# Patient Record
Sex: MALE | Race: BLACK OR AFRICAN AMERICAN
[De-identification: names, ages, dates, MRNs, and addresses within clinical notes are randomized per-mention and may not be internally consistent; named-entity substitution may affect disease eponyms.]

---

## 2017-04-21 ENCOUNTER — HOSPITAL ENCOUNTER (INPATIENT)
Dept: HOSPITAL 74 - YASAS | Age: 52
LOS: 6 days | Discharge: OTHER INSTITUTION | DRG: 773 | End: 2017-04-27
Attending: INTERNAL MEDICINE | Admitting: INTERNAL MEDICINE
Payer: COMMERCIAL

## 2017-04-21 VITALS — BODY MASS INDEX: 26.4 KG/M2

## 2017-04-21 DIAGNOSIS — F14.20: ICD-10-CM

## 2017-04-21 DIAGNOSIS — F12.20: ICD-10-CM

## 2017-04-21 DIAGNOSIS — F32.9: ICD-10-CM

## 2017-04-21 DIAGNOSIS — F11.23: Primary | ICD-10-CM

## 2017-04-21 DIAGNOSIS — F19.24: ICD-10-CM

## 2017-04-21 LAB
APPEARANCE UR: CLEAR
BILIRUB UR STRIP.AUTO-MCNC: NEGATIVE MG/DL
COLOR UR: YELLOW
HIV 1 & 2 AB: NEGATIVE
HIV 1 AGP24: NEGATIVE
KETONES UR QL STRIP: NEGATIVE
LEUKOCYTE ESTERASE UR QL STRIP.AUTO: NEGATIVE
NITRITE UR QL STRIP: NEGATIVE
PH UR: 5 [PH] (ref 5–8)
PROT UR QL STRIP: NEGATIVE
PROT UR QL STRIP: NEGATIVE
RBC # UR STRIP: NEGATIVE /UL
SP GR UR: 1.02 (ref 1–1.03)
UROBILINOGEN UR STRIP-MCNC: NEGATIVE E.U./DL (ref 0.2–1)

## 2017-04-21 PROCEDURE — HZ2ZZZZ DETOXIFICATION SERVICES FOR SUBSTANCE ABUSE TREATMENT: ICD-10-PCS | Performed by: INTERNAL MEDICINE

## 2017-04-21 RX ADMIN — IBUPROFEN PRN MG: 400 TABLET, FILM COATED ORAL at 17:07

## 2017-04-21 RX ADMIN — NICOTINE SCH MG: 14 PATCH, EXTENDED RELEASE TRANSDERMAL at 15:02

## 2017-04-21 RX ADMIN — Medication SCH MG: at 22:50

## 2017-04-21 RX ADMIN — ACETAMINOPHEN PRN MG: 325 TABLET ORAL at 21:06

## 2017-04-21 NOTE — HP
COWS





- Scale


Resting Pulse: 0= [place] 80 or Below


Sweatin= Chills/Flushing


Restless Observation: 3= Extraneous Movement


Pupil Size: 2= Moderately Dilated


Bone or Joint Aches: 4=Acute Joint/Muscle Pain


Runny Nose/ Eye Tearin= Nasal Congestion


GI Upset > 30mins: 1= Stomach Cramp


Tremor Observation: 2= Slight Tremor Visible


Yawning Observation: 2= >3x During Session


Anxiety or Irritability: 2=Irritable/Anxious


Goose Flesh Skin: 0=Smooth Skin


COWS Score: 18





Admission ROS [facility]





- HPI


Chief Complaint: 


DETOX TX FOR HEROIN DEPENDENCE


Allergies/Adverse Reactions: 


 Allergies











Allergy/AdvReac Type Severity Reaction Status Date / Time


 


PENICILIN Allergy Severe TREMORS Uncoded 17 11:20











History of Present Illness: 


52 Y/O MALE WITH A HX OF HEROIN DEPENDENCE SEEKING DETOX TX


Exam Limitations: No Limitations





- Ebola screening


Have you traveled outside of the country in the last 21 days: No


Have you had contact with anyone from an Ebola affected area: No


Have you been sick,other than usual withdrawal symptoms: No





- Review of Systems


Constitutional: Chills, Loss of Appetite, Night Sweats, Changes in sleep


EENT: reports: Tearing, Nose Congestion, Dental Problems (MISSING TEETH/DECAY)


Respiratory: reports: No Symptoms reported


Cardiac: reports: No Symptoms Reported


GI: reports: Constipated, Diarrhea, Nausea, Vomiting


: reports: No Symptoms Reported


Musculoskeletal: reports: Back Pain, Muscle Pain


Integumentary: reports: No Symptoms Reported


Neuro: reports: No Symptoms reported


Endocrine: reports: No Symptoms Reported


Hematology: reports: No Symptoms Reported


Psychiatric: reports: Orientated x3, Agitated, Anxious, Depressed


Other Systems: Reviewed and Negative





Patient History





- Patient Medical History


Hx Anemia: No


Hx Asthma: No


Hx Chronic Obstructive Pulmonary Disease (COPD): No


Hx Cardiac Disorders: No


Hx Hypertension: No


Hx Hypercholesterolemia: No


HX Cerebrovascular Accident: No


Hx Seizures: No


Hx Diabetes: No


Hx Gastrointestinal Disorders: No


Hx Genitourinary Disorders: No


Hx Sexually Transmitted Disorders: No


Hx Renal Disease (ESRD): No


Hx Thyroid Disease: No


Hx Human Immunodeficiency Virus (HIV): No (NEGATIVE HX)


Hx Hepatitis C: No


Hx Depression: Yes (NO MED)


Hx Suicide Attempt: No


Hx Schizophrenia: No





- Patient Surgical History


Past Surgical History: No


Hx Neurologic Surgery: No


Hx Cataract Extraction: No


Hx Cardiac Surgery: No


Hx Lung Surgery: No


Hx Breast Surgery: No


Hx Breast Biopsy: No


Hx Abdominal Surgery: No


Hx Appendectomy: No


Hx Cholecystectomy: No


Hx Genitourinary Surgery: No


Hx Orthopedic Surgery: No


Anesthesia Reaction: No





- PPD History


Previous Implant?: Yes


Documented Results: Negative w/o proof


PPD to be Administered?: Yes





- Reproductive History


Patient is a Female of Child Bearing Age (11 -55 yrs old): No (MALE)





- Smoking Cessation


Smoking history: Current every day smoker


Have you smoked in the past 12 months: Yes


Aproximately how many cigarettes per day: 10


Hx Chewing Tobacco Use: No


Initiated information on smoking cessation: Yes


'Breaking Loose' booklet given: 17





- Substance & Tx. History


Hx Substance Use: Yes (HEROIN/COCAINE/MARIJUANA)


Substance Use Type: Cocaine, Heroin, Marijuana


Hx Substance Use Treatment: Yes





- Substances Abused


  ** Heroin


Route: Inhalation


Frequency: Daily


Amount used: 8-10 bags


Age of first use: 31


Date of Last Use: 17





  ** Crack


Route: Smoking


Frequency: Daily


Amount used: $100


Age of first use: 18


Date of Last Use: 17





  ** Marijuana


Route: Smoking


Frequency: Daily


Amount used: $25


Age of first use: 18


Date of Last Use: 17





Family Disease History





- Family Disease History


Family History: Denies





Admission Physical Exam [facility]





- Vital Signs


Vital Signs: 


 Vital Signs - 24 hr











  17





  10:32


 


Temperature 96.8 F L


 


Pulse Rate 65


 


Respiratory 18





Rate 


 


Blood Pressure 114/70














- Physical


General Appearance: Yes: Moderate Distress, Irritable, Anxious


HEENTM: Yes: EOMI, Normocephalic, REJI, Pharynx Normal


Respiratory: Yes: Chest Non-Tender, Lungs Clear, Normal Breath Sounds, No 

Respiratory Distress


Neck: Yes: Supple, Trachea in good position


Breast: Yes: Breast Exam Deferred


Cardiology: Yes: Regular Rhythm, Regular Rate, S1, S2


Abdominal: Yes: Normal Bowel Sounds, Non Tender, Soft


Genitourinary: Yes: Other (N/C)


Back: Yes: Within Normal Limits


Musculoskeletal: Yes: full range of Motion, Gait Steady


Extremities: Yes: Normal Range of Motion, Non-Tender


Neurological: Yes: CNs II-XII NML intact, Fully Oriented, Alert, Motor Strength 

5/5


Integumentary: Yes: Dry, Warm


Lymphatic: Yes: Within Normal Limits





- Diagnostic


(1) Opioid dependence with withdrawal


Current Visit: Yes   Status: Acute





(2) Cocaine dependence with withdrawal


Current Visit: Yes   Status: Acute





(3) Cannabis dependence, uncomplicated


Current Visit: Yes   Status: Acute





(4) History of depression


Current Visit: Yes   Status: Chronic








Cleared for Admission [facility]





- Detox or Rehab


[facility] Level of Care: Medically Managed


Detox Regimen/Protocol: Methadone





[facility] Breath Alcohol Content


Breath Alcohol Content: 0





Urine Drug Screen





- Results


Drug Screen Negative: No


Urine Drug Screen Results: THC-Marijuana, JESSICA-Cocaine, OPI-Opiates

## 2017-04-22 LAB
ALBUMIN SERPL-MCNC: 2.9 G/DL (ref 3.4–5)
ALP SERPL-CCNC: 67 U/L (ref 45–117)
ALT SERPL-CCNC: 18 U/L (ref 12–78)
ANION GAP SERPL CALC-SCNC: 8 MMOL/L (ref 8–16)
AST SERPL-CCNC: 17 U/L (ref 15–37)
BILIRUB SERPL-MCNC: 0.1 MG/DL (ref 0.2–1)
CALCIUM SERPL-MCNC: 8.4 MG/DL (ref 8.5–10.1)
CO2 SERPL-SCNC: 25 MMOL/L (ref 21–32)
COCKROFT - GAULT: 121.02
CREAT SERPL-MCNC: 0.9 MG/DL (ref 0.7–1.3)
DEPRECATED RDW RBC AUTO: 15.5 % (ref 11.9–15.9)
GLUCOSE SERPL-MCNC: 74 MG/DL (ref 74–106)
MCH RBC QN AUTO: 31.2 PG (ref 25.7–33.7)
MCHC RBC AUTO-ENTMCNC: 33 G/DL (ref 32–35.9)
MCV RBC: 94.7 FL (ref 80–96)
PLATELET # BLD AUTO: 303 K/MM3 (ref 134–434)
PMV BLD: 8.8 FL (ref 7.5–11.1)
PROT SERPL-MCNC: 6.4 G/DL (ref 6.4–8.2)
SICKLE CELL SCREEN: NEGATIVE
WBC # BLD AUTO: 7 K/MM3 (ref 4–10)

## 2017-04-22 RX ADMIN — IBUPROFEN PRN MG: 400 TABLET, FILM COATED ORAL at 06:04

## 2017-04-22 RX ADMIN — Medication SCH: at 23:15

## 2017-04-22 RX ADMIN — Medication SCH TAB: at 10:31

## 2017-04-22 RX ADMIN — NICOTINE SCH MG: 14 PATCH, EXTENDED RELEASE TRANSDERMAL at 10:32

## 2017-04-22 NOTE — PN
[facility] COWS





- Scale


Resting Pulse: 0= [place] 80 or Below


Sweatin=Flushed/Facial Moisture


Restless Observation: 1= Difficult to Sit Still


Pupil Size: 0= Normal to Room Light


Bone or Joint Aches: 2= Severe Diffuse Aches


Runny Nose/ Eye Tearin= Runny Nose/Eyes


GI Upset > 30mins: 2= Nausea/Diarrhea


Tremor Observation of Outstretched Hands: 2= Slight Tremor Visible


Yawning Observation: 0= None


Anxiety or Irritability: 2=Irritable/Anxious


Goose Flesh Skin: 3=Piloerection


COWS Score: 16





[facility] Progress Note (SOAP)


Subjective: 


sweats, shakes, abdominal cramps 


Objective: 





17 14:08


 Vital Signs - 8 hr











  17





  06:48 09:49


 


Temperature 96.8 F L 97.2 F L


 


Pulse Rate 64 68


 


Respiratory 18 18





Rate  


 


Blood Pressure 135/78 110/76








 Laboratory Last Values











WBC  7.0 K/mm3 (4.0-10.0)   17  06:16    


 


RBC  3.98 M/mm3 (4.00-5.60)  L  17  06:16    


 


Hgb  12.4 GM/dL (11.7-16.9)   17  06:16    


 


Hct  37.7 % (35.4-49)   17  06:16    


 


MCV  94.7 fl (80-96)   17  06:16    


 


MCHC  33.0 g/dl (32.0-35.9)   17  06:16    


 


RDW  15.5 % (11.9-15.9)   17  06:16    


 


Plt Count  303 K/MM3 (134-434)   17  06:16    


 


MPV  8.8 fl (7.5-11.1)   17  06:16    


 


Sickle Cell Screen  Negative  (NEGATIVE)   17  06:16    


 


Sodium  143 mmol/L (136-145)   17  06:16    


 


Potassium  4.4 mmol/L (3.5-5.1)   17  06:16    


 


Chloride  110 mmol/L ()  H  17  06:16    


 


Carbon Dioxide  25 mmol/L (21-32)   17  06:16    


 


Anion Gap  8  (8-16)   17  06:16    


 


BUN  16 mg/dL (7-18)   17  06:16    


 


Creatinine  0.9 mg/dL (0.7-1.3)   17  06:16    


 


Creat Clearance w eGFR  > 60  (>60)   17  06:16    


 


Random Glucose  74 mg/dL ()   17  06:16    


 


Calcium  8.4 mg/dL (8.5-10.1)  L  17  06:16    


 


Total Bilirubin  0.1 mg/dL (0.2-1.0)  L  17  06:16    


 


AST  17 U/L (15-37)   17  06:16    


 


ALT  18 U/L (12-78)   17  06:16    


 


Alkaline Phosphatase  67 U/L ()   17  06:16    


 


Total Protein  6.4 g/dl (6.4-8.2)   17  06:16    


 


Albumin  2.9 g/dl (3.4-5.0)  L  17  06:16    


 


Urine Color  Yellow   17  13:00    


 


Urine Appearance  Clear   17  13:00    


 


Urine pH  5.0  (5.0-8.0)   17  13:00    


 


Ur Specific Gravity  1.023  (1.001-1.035)   17  13:00    


 


Urine Protein  Negative  (NEGATIVE)   17  13:00    


 


Urine Glucose (UA)  Negative  (NEGATIVE)   17  13:00    


 


Urine Ketones  Negative  (NEGATIVE)   17  13:00    


 


Urine Blood  Negative  (NEGATIVE)   17  13:00    


 


Urine Nitrite  Negative  (NEGATIVE)   17  13:00    


 


Urine Bilirubin  Negative  (NEGATIVE)   17  13:00    


 


Urine Urobilinogen  Negative E.U./dl (0.2-1.0)   17  13:00    


 


Ur Leukocyte Esterase  Negative  (NEGATIVE)   17  13:00    


 


RPR Titer  Nonreactive  (NONREACTIVE)   17  06:16    


 


HIV 1&2 Antibody Screen  Negative   17  12:10    


 


HIV P24 Antigen  Negative   17  12:10    








labs noted


Assessment: 





17 14:09


withdrawal sx


Plan: 


continue detox

## 2017-04-22 NOTE — EKG
Test Reason : 

Blood Pressure : ***/*** mmHG

Vent. Rate : 066 BPM     Atrial Rate : 066 BPM

   P-R Int : 148 ms          QRS Dur : 090 ms

    QT Int : 392 ms       P-R-T Axes : 057 060 051 degrees

   QTc Int : 410 ms

 

NORMAL SINUS RHYTHM

NORMAL ECG

NO PREVIOUS ECGS AVAILABLE

Confirmed by MYRNA PISANO MD ([id number]) on 4/22/2017 10:10:14 AM

 

Referred By:             Confirmed By:MYRNA PISANO MD

## 2017-04-22 NOTE — CONSULT
[facility] Psychiatric Consult





- Data


Date of interview: 04/22/17


Admission source: [facility]


Identifying data: This is 51 years old male with no psychiatric hospitalization 

history nintoxicAted with:  Heroin Cocaine and Cannabis


Substance Abuse History: - Substances Abused.  ** Heroin.  Route: Inhalation.  

Frequency: Daily.  Amount used: 8-10 bags.  Age of first use: 31.  Date of Last 

Use: 04/21/17.  ** Crack.  Route: Smoking.  Frequency: Daily.  Amount used: $

100.  Age of first use: 18.  Date of Last Use: 04/20/17.  ** Marijuana.  Route: 

Smoking.  Frequency: Daily.  Amount used: $25.  Age of first use: 18.  Date of 

Last Use: 04/20/17


Medical History: Denies


Psychiatric History: Patient denies past psychiatric history


Physical/Sexual Abuse/Trauma History: Denies


Additional Comment: Observation.  Detox Unit Care Protocol





Mental Status Exam





- Mental Status Exam


Alert and Oriented to: Person


Cognitive Function: Fair


Patient Appearance: Well Groomed


Mood: Apprehensive


Affect: Mood Congruent


Patient Behavior: Cooperative


Speech Pattern: Appropriate


Voice Loudness: Normal


Thought Process: Goal Oriented


Thought Disorder: Being Controlled


Hallucinations: Denies


Suicidal Ideation: Denies


Homicidal Ideation: Denies


Insight/Judgement: Fair


Sleep: Difficulty falling asleep


Appetite: Fair


Muscle strength/Tone: Normal


Gait/Station: Normal


Additional Comments: Observation.  Detox Unit Care Protocol





Psychiatric Findings





- Problem List (Axis 1, 2,3)


(1) Cannabis dependence, uncomplicated


Current Visit: Yes   Status: Acute





(2) Cocaine dependence with withdrawal


Current Visit: Yes   Status: Acute





(3) Opioid dependence with withdrawal


Current Visit: Yes   Status: Acute





(4) Drug-induced mood disorder


Current Visit: Yes   Status: Suspected








- Initial Treatment Plan


Initial Treatment Plan: Observation.  Detox Unit Care Protocol

## 2017-04-22 NOTE — DS
[facility] Detox Discharge Summary


Admission Date: 


04/21/17





Discharge Date: 04/22/17





- History


Present History: Cannabis Dependence, Opioid Dependence





- Physical Exam Results


Vital Signs: 


 Vital Signs











Temperature  98.1 F   04/22/17 14:59


 


Pulse Rate  62   04/22/17 14:59


 


Respiratory Rate  18   04/22/17 14:59


 


Blood Pressure  119/70   04/22/17 14:59


 


O2 Sat by Pulse Oximetry (%)      











Pertinent Admission Physical Exam Findings: 


withdrawal sx





- Medication


Discharge Medications: 


Ambulatory Orders





NK [No Known Home Medication]  04/21/17 











- Diagnosis


(1) Cannabis dependence, uncomplicated


Current Visit: Yes   Status: Acute





(2) Cocaine dependence with withdrawal


Current Visit: Yes   Status: Acute





(3) Opioid dependence with withdrawal


Current Visit: Yes   Status: Acute





(4) History of depression


Current Visit: Yes   Status: Chronic








- AMA


Did Patient Leave Against Medical Advice: No (Administrative d/c, destroyed 

property (bed))

## 2017-04-23 LAB
APPEARANCE UR: CLEAR
BILIRUB UR STRIP.AUTO-MCNC: NEGATIVE MG/DL
COLOR UR: (no result)
KETONES UR QL STRIP: NEGATIVE
LEUKOCYTE ESTERASE UR QL STRIP.AUTO: NEGATIVE
NITRITE UR QL STRIP: NEGATIVE
PH UR: 7 [PH] (ref 5–8)
PROT UR QL STRIP: NEGATIVE
PROT UR QL STRIP: NEGATIVE
RBC # UR STRIP: NEGATIVE /UL
SP GR UR: 1.01 (ref 1–1.03)
UROBILINOGEN UR STRIP-MCNC: NEGATIVE E.U./DL (ref 0.2–1)

## 2017-04-23 RX ADMIN — ACETAMINOPHEN PRN MG: 325 TABLET ORAL at 11:35

## 2017-04-23 RX ADMIN — Medication SCH TAB: at 10:31

## 2017-04-23 RX ADMIN — ACETAMINOPHEN PRN MG: 325 TABLET ORAL at 23:00

## 2017-04-23 RX ADMIN — Medication SCH MG: at 22:47

## 2017-04-23 RX ADMIN — NICOTINE SCH MG: 14 PATCH, EXTENDED RELEASE TRANSDERMAL at 10:32

## 2017-04-23 NOTE — PN
[facility] COWS





- Scale


Resting Pulse: 0= [place] 80 or Below


Sweatin= Chills/Flushing


Restless Observation: 3= Extraneous Movement


Pupil Size: 0= Normal to Room Light


Bone or Joint Aches: 2= Severe Diffuse Aches


Runny Nose/ Eye Tearin= Runny Nose/Eyes


GI Upset > 30mins: 1= Stomach Cramp


Tremor Observation of Outstretched Hands: 0= None


Yawning Observation: 1= 1-2x During Session


Anxiety or Irritability: 2=Irritable/Anxious


Goose Flesh Skin: 0=Smooth Skin


COWS Score: 12





[facility]HS Progress Note (SOAP)


Subjective: 


Anxious, sweating, LBP, interrupted sleep


Objective: 





17 16:21


 Laboratory Tests











  17





  12:10 13:00 06:16


 


WBC    7.0


 


RBC    3.98 L


 


Hgb    12.4


 


Hct    37.7


 


MCV    94.7


 


MCHC    33.0


 


RDW    15.5


 


Plt Count    303


 


MPV    8.8


 


Sickle Cell Screen    Negative


 


Sodium   


 


Potassium   


 


Chloride   


 


Carbon Dioxide   


 


Anion Gap   


 


BUN   


 


Creatinine   


 


Creat Clearance w eGFR   


 


Random Glucose   


 


Calcium   


 


Total Bilirubin   


 


AST   


 


ALT   


 


Alkaline Phosphatase   


 


Total Protein   


 


Albumin   


 


Urine Color   Yellow 


 


Urine Appearance   Clear 


 


Urine pH   5.0 


 


Ur Specific Gravity   1.023 


 


Urine Protein   Negative 


 


Urine Glucose (UA)   Negative 


 


Urine Ketones   Negative 


 


Urine Blood   Negative 


 


Urine Nitrite   Negative 


 


Urine Bilirubin   Negative 


 


Urine Urobilinogen   Negative 


 


Ur Leukocyte Esterase   Negative 


 


RPR Titer   


 


HIV 1&2 Antibody Screen  Negative  


 


HIV P24 Antigen  Negative  














  17





  06:16 06:16 10:00


 


WBC   


 


RBC   


 


Hgb   


 


Hct   


 


MCV   


 


MCHC   


 


RDW   


 


Plt Count   


 


MPV   


 


Sickle Cell Screen   


 


Sodium  143  


 


Potassium  4.4  


 


Chloride  110 H  


 


Carbon Dioxide  25  


 


Anion Gap  8  


 


BUN  16  


 


Creatinine  0.9  


 


Creat Clearance w eGFR  > 60  


 


Random Glucose  74  


 


Calcium  8.4 L  


 


Total Bilirubin  0.1 L  


 


AST  17  


 


ALT  18  


 


Alkaline Phosphatase  67  


 


Total Protein  6.4  


 


Albumin  2.9 L  


 


Urine Color    Straw


 


Urine Appearance    Clear


 


Urine pH    7.0  D


 


Ur Specific [place]    1.011


 


Urine Protein    Negative


 


Urine Glucose (UA)    Negative


 


Urine Ketones    Negative


 


Urine Blood    Negative


 


Urine Nitrite    Negative


 


Urine Bilirubin    Negative


 


Urine Urobilinogen    Negative


 


Ur Leukocyte Esterase    Negative


 


RPR Titer   Nonreactive 


 


HIV 1&2 Antibody Screen   


 


HIV P24 Antigen   








Labs noted


Assessment: 





17 16:21


Withdrawal symptoms


Plan: 


Continue detox

## 2017-04-24 RX ADMIN — Medication SCH TAB: at 10:20

## 2017-04-24 RX ADMIN — ZOLPIDEM TARTRATE PRN MG: 10 TABLET, FILM COATED ORAL at 22:39

## 2017-04-24 RX ADMIN — Medication SCH MG: at 22:37

## 2017-04-24 RX ADMIN — ACETAMINOPHEN PRN MG: 325 TABLET ORAL at 10:22

## 2017-04-24 RX ADMIN — NICOTINE SCH MG: 14 PATCH, EXTENDED RELEASE TRANSDERMAL at 10:21

## 2017-04-24 NOTE — PN
[facility] Progress Note (SOAP)


Subjective: 


Sweating,interrupted sleep,restless,tremors,


Objective: 





04/24/17 14:48


 Vital Signs - 8 hr











  04/24/17 04/24/17





  09:54 14:14


 


Temperature 97.0 F L 


 


Pulse Rate 78 77


 


Respiratory 18 22





Rate  


 


Blood Pressure 99/65 110/68








 Laboratory Last Values











WBC  7.0 K/mm3 (4.0-10.0)   04/22/17  06:16    


 


RBC  3.98 M/mm3 (4.00-5.60)  L  04/22/17  06:16    


 


Hgb  12.4 GM/dL (11.7-16.9)   04/22/17  06:16    


 


Hct  37.7 % (35.4-49)   04/22/17  06:16    


 


MCV  94.7 fl (80-96)   04/22/17  06:16    


 


MCHC  33.0 g/dl (32.0-35.9)   04/22/17  06:16    


 


RDW  15.5 % (11.9-15.9)   04/22/17  06:16    


 


Plt Count  303 K/MM3 (134-434)   04/22/17  06:16    


 


MPV  8.8 fl (7.5-11.1)   04/22/17  06:16    


 


Sickle Cell Screen  Negative  (NEGATIVE)   04/22/17  06:16    


 


Sodium  143 mmol/L (136-145)   04/22/17  06:16    


 


Potassium  4.4 mmol/L (3.5-5.1)   04/22/17  06:16    


 


Chloride  110 mmol/L ()  H  04/22/17  06:16    


 


Carbon Dioxide  25 mmol/L (21-32)   04/22/17  06:16    


 


Anion Gap  8  (8-16)   04/22/17  06:16    


 


BUN  16 mg/dL (7-18)   04/22/17  06:16    


 


Creatinine  0.9 mg/dL (0.7-1.3)   04/22/17  06:16    


 


Creat Clearance w eGFR  > 60  (>60)   04/22/17  06:16    


 


Random Glucose  74 mg/dL ()   04/22/17  06:16    


 


Calcium  8.4 mg/dL (8.5-10.1)  L  04/22/17  06:16    


 


Total Bilirubin  0.1 mg/dL (0.2-1.0)  L  04/22/17  06:16    


 


AST  17 U/L (15-37)   04/22/17  06:16    


 


ALT  18 U/L (12-78)   04/22/17  06:16    


 


Alkaline Phosphatase  67 U/L ()   04/22/17  06:16    


 


Total Protein  6.4 g/dl (6.4-8.2)   04/22/17  06:16    


 


Albumin  2.9 g/dl (3.4-5.0)  L  04/22/17  06:16    


 


Urine Color  Straw   04/23/17  10:00    


 


Urine Appearance  Clear   04/23/17  10:00    


 


Urine pH  7.0  (5.0-8.0)  D 04/23/17  10:00    


 


Ur Specific Gravity  1.011  (1.001-1.035)   04/23/17  10:00    


 


Urine Protein  Negative  (NEGATIVE)   04/23/17  10:00    


 


Urine Glucose (UA)  Negative  (NEGATIVE)   04/23/17  10:00    


 


Urine Ketones  Negative  (NEGATIVE)   04/23/17  10:00    


 


Urine Blood  Negative  (NEGATIVE)   04/23/17  10:00    


 


Urine Nitrite  Negative  (NEGATIVE)   04/23/17  10:00    


 


Urine Bilirubin  Negative  (NEGATIVE)   04/23/17  10:00    


 


Urine Urobilinogen  Negative E.U./dl (0.2-1.0)   04/23/17  10:00    


 


Ur Leukocyte Esterase  Negative  (NEGATIVE)   04/23/17  10:00    


 


RPR Titer  Nonreactive  (NONREACTIVE)   04/22/17  06:16    


 


HIV 1&2 Antibody Screen  Negative   04/21/17  12:10    


 


HIV P24 Antigen  Negative   04/21/17  12:10    








labs noted


Assessment: 





04/24/17 14:48


Withdrawal sx.


Plan: 


Continue detox

## 2017-04-24 NOTE — PN
[facility] Progress Note


Note: 


Psychiatry


Attending's note : 


Approached by patient.


Complaint : insomnia.


Chart reviewed.Dr Cuevas's note is appreciated.


Intervention :


Education about virtues of sleep hygiene.


Zolpidem 10 mg po hs.Side effects/benefits discussed.


Mr Taylor is made aware of risk of parasomnias.


He agrees with this careplan.

## 2017-04-25 RX ADMIN — ZOLPIDEM TARTRATE PRN MG: 10 TABLET, FILM COATED ORAL at 22:24

## 2017-04-25 RX ADMIN — BACITRACIN ZINC SCH GM: 500 OINTMENT TOPICAL at 14:38

## 2017-04-25 RX ADMIN — BACITRACIN ZINC SCH GM: 500 OINTMENT TOPICAL at 22:23

## 2017-04-25 RX ADMIN — Medication SCH MG: at 22:22

## 2017-04-25 RX ADMIN — Medication SCH TAB: at 10:20

## 2017-04-25 RX ADMIN — HYDROCORTISONE SCH APPLIC: 1 CREAM TOPICAL at 22:23

## 2017-04-25 RX ADMIN — NICOTINE SCH MG: 14 PATCH, EXTENDED RELEASE TRANSDERMAL at 10:20

## 2017-04-25 RX ADMIN — HYDROCORTISONE SCH APPLIC: 1 CREAM TOPICAL at 14:38

## 2017-04-25 NOTE — PN
[facility] Progress Note (SOAP)


Subjective: 


DECREASED ANXIETY,SWEATS,IRRITABILITY.


Objective: 





04/25/17 10:46


 Vital Signs











Temperature  96.4 F L  04/25/17 09:34


 


Pulse Rate  79   04/25/17 09:34


 


Respiratory Rate  18   04/25/17 09:34


 


Blood Pressure  110/68   04/25/17 09:34


 


O2 Sat by Pulse Oximetry (%)      











Assessment: 





04/25/17 10:46


WITHDRAWAL SX


Plan: 


CONTINUE DETOX

## 2017-04-25 NOTE — DS
[facility] Detox Discharge Summary


Admission Date: 


04/21/17








- History


Present History: Cannabis Dependence, Cocaine Dependence, Opioid Dependence


Additional Comments: 


DECREASED ANXIETY,SWEATS,IRRITABILITY.





- Physical Exam Results


Vital Signs: 


 Vital Signs











Temperature  96.4 F L  04/25/17 09:34


 


Pulse Rate  79   04/25/17 09:34


 


Respiratory Rate  18   04/25/17 09:34


 


Blood Pressure  110/68   04/25/17 09:34


 


O2 Sat by Pulse Oximetry (%)      














- Medication


Discharge Medications: 


Ambulatory Orders





NK [No Known Home Medication]  04/21/17 











- Diagnosis


(1) Opioid dependence with withdrawal


Current Visit: Yes   Status: Acute





(2) Cocaine dependence with withdrawal


Current Visit: Yes   Status: Acute





(3) Cannabis dependence, uncomplicated


Current Visit: Yes   Status: Acute





(4) History of depression


Current Visit: Yes   Status: Chronic

## 2017-04-26 RX ADMIN — BACITRACIN ZINC SCH GM: 500 OINTMENT TOPICAL at 22:50

## 2017-04-26 RX ADMIN — HYDROCORTISONE SCH: 1 CREAM TOPICAL at 10:27

## 2017-04-26 RX ADMIN — HYDROCORTISONE SCH APPLIC: 1 CREAM TOPICAL at 22:53

## 2017-04-26 RX ADMIN — NICOTINE SCH: 14 PATCH, EXTENDED RELEASE TRANSDERMAL at 10:27

## 2017-04-26 RX ADMIN — Medication SCH MG: at 22:50

## 2017-04-26 RX ADMIN — Medication SCH TAB: at 10:27

## 2017-04-26 RX ADMIN — BACITRACIN ZINC SCH: 500 OINTMENT TOPICAL at 10:28

## 2017-04-26 RX ADMIN — ZOLPIDEM TARTRATE PRN MG: 10 TABLET, FILM COATED ORAL at 22:51

## 2017-04-27 ENCOUNTER — HOSPITAL ENCOUNTER (INPATIENT)
Dept: HOSPITAL 74 - YASAS | Age: 52
LOS: 15 days | Discharge: HOME | DRG: 772 | End: 2017-05-12
Attending: PSYCHIATRY & NEUROLOGY | Admitting: PSYCHIATRY & NEUROLOGY
Payer: COMMERCIAL

## 2017-04-27 VITALS — BODY MASS INDEX: 26.4 KG/M2

## 2017-04-27 VITALS — SYSTOLIC BLOOD PRESSURE: 112 MMHG | TEMPERATURE: 96.9 F | HEART RATE: 91 BPM | DIASTOLIC BLOOD PRESSURE: 74 MMHG

## 2017-04-27 DIAGNOSIS — F19.24: ICD-10-CM

## 2017-04-27 DIAGNOSIS — F11.23: Primary | ICD-10-CM

## 2017-04-27 DIAGNOSIS — F14.23: ICD-10-CM

## 2017-04-27 DIAGNOSIS — F12.20: ICD-10-CM

## 2017-04-27 DIAGNOSIS — F31.9: ICD-10-CM

## 2017-04-27 RX ADMIN — NICOTINE SCH MG: 14 PATCH, EXTENDED RELEASE TRANSDERMAL at 09:43

## 2017-04-27 RX ADMIN — Medication SCH MG: at 21:32

## 2017-04-27 RX ADMIN — BACITRACIN ZINC SCH GM: 500 OINTMENT TOPICAL at 09:42

## 2017-04-27 RX ADMIN — HYDROCORTISONE SCH APPLIC: 1 CREAM TOPICAL at 09:43

## 2017-04-27 RX ADMIN — Medication SCH TAB: at 09:43

## 2017-04-27 NOTE — HP
[facility] MD Rehab Assess/Revision





- Admission History


Admitted to Rehab from: [facility]


Date of Admission to Rehab: 04/27/17





- Vital signs


Vital Signs: 


 Vital Signs











 Period  Temp  Pulse  Resp  BP Sys/Bland  Pulse Ox


 


 Last 24 Hr  98.9 F  96  20  123/58  














- Findings


Detox History & Physical reviewed: Yes


Concur with findings: Yes


Comments/Additional Findings: transferred from detox to rehab admission as per 

protocol

## 2017-04-27 NOTE — DS
[facility] Detox Discharge Summary


Admission Date: 


04/21/17





Discharge Date: 04/27/17





- History


Present History: Cannabis Dependence, Cocaine Dependence, Opioid Dependence


Additional Comments: 


DETOX COMPLETED. ALERT O X 3. NAD.


Pertinent Past History: 


DENIES PMHx


DEPRESSION HX





- Physical Exam Results


Vital Signs: 


 Vital Signs











Temperature  96.9 F L  04/27/17 09:20


 


Pulse Rate  91 H  04/27/17 09:20


 


Respiratory Rate  20   04/27/17 09:20


 


Blood Pressure  112/74   04/27/17 09:20


 


O2 Sat by Pulse Oximetry (%)      











Pertinent Admission Physical Exam Findings: 


WITHDRAWAL SX 


 Laboratory Last Values











WBC  7.0 K/mm3 (4.0-10.0)   04/22/17  06:16    


 


RBC  3.98 M/mm3 (4.00-5.60)  L  04/22/17  06:16    


 


Hgb  12.4 GM/dL (11.7-16.9)   04/22/17  06:16    


 


Hct  37.7 % (35.4-49)   04/22/17  06:16    


 


MCV  94.7 fl (80-96)   04/22/17  06:16    


 


MCHC  33.0 g/dl (32.0-35.9)   04/22/17  06:16    


 


RDW  15.5 % (11.9-15.9)   04/22/17  06:16    


 


Plt Count  303 K/MM3 (134-434)   04/22/17  06:16    


 


MPV  8.8 fl (7.5-11.1)   04/22/17  06:16    


 


Sickle Cell Screen  Negative  (NEGATIVE)   04/22/17  06:16    


 


Sodium  143 mmol/L (136-145)   04/22/17  06:16    


 


Potassium  4.4 mmol/L (3.5-5.1)   04/22/17  06:16    


 


Chloride  110 mmol/L ()  H  04/22/17  06:16    


 


Carbon Dioxide  25 mmol/L (21-32)   04/22/17  06:16    


 


Anion Gap  8  (8-16)   04/22/17  06:16    


 


BUN  16 mg/dL (7-18)   04/22/17  06:16    


 


Creatinine  0.9 mg/dL (0.7-1.3)   04/22/17  06:16    


 


Creat Clearance w eGFR  > 60  (>60)   04/22/17  06:16    


 


Random Glucose  74 mg/dL ()   04/22/17  06:16    


 


Calcium  8.4 mg/dL (8.5-10.1)  L  04/22/17  06:16    


 


Total Bilirubin  0.1 mg/dL (0.2-1.0)  L  04/22/17  06:16    


 


AST  17 U/L (15-37)   04/22/17  06:16    


 


ALT  18 U/L (12-78)   04/22/17  06:16    


 


Alkaline Phosphatase  67 U/L ()   04/22/17  06:16    


 


Total Protein  6.4 g/dl (6.4-8.2)   04/22/17  06:16    


 


Albumin  2.9 g/dl (3.4-5.0)  L  04/22/17  06:16    


 


Urine Color  Straw   04/23/17  10:00    


 


Urine Appearance  Clear   04/23/17  10:00    


 


Urine pH  7.0  (5.0-8.0)  D 04/23/17  10:00    


 


Ur Specific Gravity  1.011  (1.001-1.035)   04/23/17  10:00    


 


Urine Protein  Negative  (NEGATIVE)   04/23/17  10:00    


 


Urine Glucose (UA)  Negative  (NEGATIVE)   04/23/17  10:00    


 


Urine Ketones  Negative  (NEGATIVE)   04/23/17  10:00    


 


Urine Blood  Negative  (NEGATIVE)   04/23/17  10:00    


 


Urine Nitrite  Negative  (NEGATIVE)   04/23/17  10:00    


 


Urine Bilirubin  Negative  (NEGATIVE)   04/23/17  10:00    


 


Urine Urobilinogen  Negative E.U./dl (0.2-1.0)   04/23/17  10:00    


 


Ur Leukocyte Esterase  Negative  (NEGATIVE)   04/23/17  10:00    


 


RPR Titer  Nonreactive  (NONREACTIVE)   04/22/17  06:16    


 


HIV 1&2 Antibody Screen  Negative   04/21/17  12:10    


 


HIV P24 Antigen  Negative   04/21/17  12:10    














- Treatment


Hospital Course: Detox Protocol Followed, Detoxed Safely, Responded well, 

Discharged Condition Good, Rehab Referral Accepted


Patient has Accepted a Rehab Referral to: [address] [facility]





- Medication


Discharge Medications: 


Ambulatory Orders





NK [No Known Home Medication]  04/21/17 











- Diagnosis


(1) Opioid dependence with withdrawal


Current Visit: Yes   Status: Acute





(2) Cocaine dependence with withdrawal


Current Visit: Yes   Status: Acute





(3) Cannabis dependence, uncomplicated


Current Visit: Yes   Status: Acute





(4) History of depression


Current Visit: Yes   Status: Chronic





(5) Drug-induced mood disorder


Current Visit: Yes   Status: Suspected








- AMA


Did Patient Leave Against Medical Advice: No

## 2017-04-27 NOTE — HP
Psychiatrist Admission





- Data


Date of interview: 04/27/17


Admission source: 3N


Identifying data: This is the first [facility] 

admission for this 51 years old single Black male, father of 8 children, 

unemployed with no source of income, homeless living with friends


Medical History: Unremakable


Psychiatric History: Reports that his first psychiatric contact was in 2005 at 

[facility] substance [facility]PD. There he was diagnosed with Bipolar Disorder and 

prescribed Depakote, Xanax and another medication which he does not recall 

name. After a year, he became lost to follow up and did not resume psychiatric 

treatment till 2011 when he started seeing a psychiatrist at [facility] in 

[place] and was prescibed him same medications. He attended that clinic for 

only a year and has not had any psychiatric care since. Denies previous 

psychiatric admission or suicidal attempt. At present, reports doing well. He 

shows no evidence of overt psychosis, lai or depression. Denies suicidal, 

homicidal ideations


Physical/Sexual Abuse/Trauma History: Denies history of emotional, physical or 

sexual abuse as well as DV relationship


Additional Comment: Reports history of multiple arrests including 2 felony 

convictions. Denies being on parole/probation at present


Vital Signs: 


 Vital Signs - 24 hr











  04/27/17





  12:44


 


Temperature 98.9 F


 


Pulse Rate 96 H


 


Respiratory 20





Rate 


 


Blood Pressure 123/58











Allergies/Adverse Reactions: 


 Allergies











Allergy/AdvReac Type Severity Reaction Status Date / Time


 


penicillin V Allergy Severe TREMORS Verified 04/27/17 12:38


 


PENICILIN Allergy Severe TREMORS Uncoded 04/27/17 12:38











Date of last physical exam: 04/21/17


Concur with the findings of this exam: Yes





- Substance Abuse/Tx History


Hx Substance Use: Yes


Substance Use Type: Cocaine (Started smoking crack cocaine at age 18, consumes $

100 worth daily. Last smoked 4/20/17), Heroin (Started using heroin at age 31, 

consumes 8-10 bags daily. Last used on 4/21/17), Marijuana (Started smoking 

marijuana at age 18, consumes $25 worth daily. Last smoked on 4/20/17)


Hx Substance Use Treatment: Yes (5-6 previous inpt detox and 5-6 inpt rehab)





- Admission Criteria


Previous failed treatment: Yes


Poor recovery environment: Yes


Lacks judgement: Yes





Mental Status Exam





- Mental Status Exam


Alert and Oriented to: Time, Place, Person


Cognitive Function: Fair


Patient Appearance: Well Groomed


Mood: Hopeful, Euthymic


Affect: Appropriate


Patient Behavior: Cooperative


Speech Pattern: Clear


Voice Loudness: Normal


Thought Process: Intact


Thought Disorder: Not Present


Hallucinations: Denies


Suicidal Ideation: Denies


Homicidal Ideation: Denies


Insight/Judgement: Fair


Sleep: Fair


Appetite: Good


Muscle strength/Tone: Normal


Gait/Station: Normal





Psychiatric Findings





- Problem List (Axis 1, 2,3)


(1) Opioid dependence with withdrawal


Current Visit: No   Status: Acute





(2) Cocaine dependence with withdrawal


Current Visit: No   Status: Acute





(3) Cannabis dependence, uncomplicated


Current Visit: No   Status: Acute





(4) Substance induced mood disorder


Current Visit: Yes   Status: Acute





(5) Bipolar disorder


Current Visit: Yes   Status: Ruled-out   








- Initial Treatment Plan


Initial Treatment Plan: Monitor progress

## 2017-04-27 NOTE — PN
[facility] Progress Note


Note: 


PT'S REHAB ADMISSION AVAILABLE TODAY. BED WAS NOT AVAILABLE YESTERDAY AS 

SCHEDULED EARLIER.

## 2017-04-28 RX ADMIN — IBUPROFEN PRN MG: 400 TABLET, FILM COATED ORAL at 21:43

## 2017-04-28 RX ADMIN — NICOTINE SCH MG: 14 PATCH, EXTENDED RELEASE TRANSDERMAL at 09:50

## 2017-04-28 RX ADMIN — Medication SCH: at 10:48

## 2017-04-28 RX ADMIN — Medication SCH MG: at 21:44

## 2017-04-29 RX ADMIN — Medication SCH MG: at 21:34

## 2017-04-29 RX ADMIN — Medication SCH TAB: at 09:59

## 2017-04-29 RX ADMIN — IBUPROFEN PRN MG: 400 TABLET, FILM COATED ORAL at 21:34

## 2017-04-29 RX ADMIN — NICOTINE SCH MG: 14 PATCH, EXTENDED RELEASE TRANSDERMAL at 09:59

## 2017-04-30 RX ADMIN — IBUPROFEN PRN MG: 400 TABLET, FILM COATED ORAL at 21:40

## 2017-04-30 RX ADMIN — Medication SCH MG: at 21:40

## 2017-04-30 RX ADMIN — NICOTINE SCH: 14 PATCH, EXTENDED RELEASE TRANSDERMAL at 09:57

## 2017-04-30 RX ADMIN — IBUPROFEN PRN MG: 400 TABLET, FILM COATED ORAL at 09:55

## 2017-04-30 RX ADMIN — Medication SCH TAB: at 09:55

## 2017-05-01 RX ADMIN — HYDROXYZINE PAMOATE PRN MG: 50 CAPSULE ORAL at 22:03

## 2017-05-01 RX ADMIN — Medication SCH MG: at 22:01

## 2017-05-01 RX ADMIN — Medication SCH TAB: at 09:38

## 2017-05-01 RX ADMIN — IBUPROFEN PRN MG: 400 TABLET, FILM COATED ORAL at 09:38

## 2017-05-01 RX ADMIN — HYDROCORTISONE PRN APPLIC: 1 CREAM TOPICAL at 22:04

## 2017-05-01 RX ADMIN — NICOTINE SCH: 14 PATCH, EXTENDED RELEASE TRANSDERMAL at 09:37

## 2017-05-01 RX ADMIN — IBUPROFEN PRN MG: 400 TABLET, FILM COATED ORAL at 22:01

## 2017-05-01 NOTE — PN
Psychiatric Progress Note


Vital Signs: 


 Vital Signs











 Period  Temp  Pulse  Resp  BP Sys/Bland  Pulse Ox


 


 Last 24 Hr  97.1 F  67-99  16-18  105-112/64-68  











Date of Session: 05/01/17


Chief Complaint:: Anxiety


HPI: Patient addressing Opoid, Cocaine and Cannabis Dependence comorbid with 

Substance-Induced Mood Disorder


Current Medications: 


Active Medications











Generic Name Dose Route Start Last Admin





  Trade Name Freq  PRN Reason Stop Dose Admin


 


Acetaminophen  650 mg 04/27/17 13:26  





  Tylenol -  PO   





  Q4H PRN   





  FEVER OR PAIN   


 


Al Hydroxide/Mg Hydroxide  30 ml 04/27/17 13:26  





  Mylanta Oral Suspension -  PO   





  Q6H PRN   





  DYSPEPSIA   


 


Diphenhydramine HCl  50 mg 04/27/17 13:26  





  Benadryl -  PO   





  HSMR1 PRN   





  FOR ITCHING   


 


Eucalyptus/Menthol/Phenol/Sorbitol  1 each 04/27/17 13:26  





  Cepastat Lozenge -  MM   





  Q4H PRN   





  SORE THROAT   


 


Guaifenesin  10 ml 04/27/17 13:26  





  Robitussin Dm -  PO   





  Q6H PRN   





  COUGH   


 


Hydrocortisone  1 applic 04/27/17 13:27  





  Hytone 1% Cream -  TP   





  BID PRN   





  DRY SKIN   


 


Hydroxyzine Pamoate  50 mg 05/01/17 11:15  





  Vistaril -  PO   





  Q6H PRN   





  ANXIETY   


 


Ibuprofen  400 mg 04/27/17 13:26 05/01/17 09:38





  Motrin -  PO   400 mg





  Q6H PRN   Administration





  PAIN   


 


Loperamide HCl  4 mg 04/27/17 13:26  





  Imodium -  PO   





  Q6H PRN   





  DIARRHEA   


 


Magnesium Hydroxide  30 ml 04/27/17 13:26  





  Milk Of Magnesia -  PO   





  DAILY PRN   





  CONSTIPATION   


 


Nicotine  14 mg 04/28/17 10:00 05/01/17 09:37





  Nicoderm Patch -  TD   Not Given





  DAILY JOSEPH   


 


Nicotine Polacrilex  2 mg 04/27/17 13:26  





  Nicorette Gum -  BUC   





  Q2H PRN   





  NICOTINE REPLACEMENT RX   


 


Prenatal Multivit/Folic Acid/Iron  1 tab 04/28/17 10:00 05/01/17 09:38





  Prenatal Vitamins (Sjr) -  PO   1 tab





  DAILY JOSEPH   Administration


 


Pseudoephedrine/Triprolidine  1 combo 04/27/17 13:26  





  Actifed -  PO   





  TID PRN   





  NASAL CONGESTION   


 


Thiamine HCl  100 mg 04/27/17 22:00 04/30/17 21:40





  Vitamin B1 -  PO   100 mg





  HS JOSPEH   Administration











Medication(s) Change(s): Start Vistaril 50 mg po Q 4hrs prn for anxiety


Current Side Effect: No


Lab tests ordered: Yes


Lab tests reviewed: Yes


Provider note:: Patient reports that he has been feeling anxious and requests 

to have medication ordered. Anxiolytic properties as well as adverse-effects of 

Vistaril were discussed with patient and he agreed to try it


Total face to face time:: 25





Mental Status Exam





- Mental Status Exam


Alert and Oriented to: Time, Place, Person


Cognitive Function: Fair


Patient Appearance: Well Groomed


Mood: Anxious


Affect: Appropriate


Patient Behavior: Cooperative


Speech Pattern: Clear


Voice Loudness: Normal


Thought Process: Intact


Thought Disorder: Not Present


Hallucinations: Denies


Suicidal Ideation: Denies


Homicidal Ideation: Denies


Insight/Judgement: Fair


Sleep: Poorly


Appetite: Good


Muscle strength/Tone: Normal


Gait/Station: Normal





Psychiatric Treatment Plan





- Problem List


(1) Opioid dependence with withdrawal


Current Visit: No





(2) Cocaine dependence with withdrawal


Current Visit: No





(3) Cannabis dependence, uncomplicated


Current Visit: No





(4) Substance induced mood disorder


Current Visit: Yes





(5) Bipolar disorder


Current Visit: Yes   


Initial treatment plan: 1) Start Vistaril 50 mg po Q 4hrs prn for anxiety.  2) 

Monitor progress

## 2017-05-02 RX ADMIN — Medication SCH TAB: at 09:58

## 2017-05-02 RX ADMIN — IBUPROFEN PRN MG: 400 TABLET, FILM COATED ORAL at 09:55

## 2017-05-02 RX ADMIN — HYDROXYZINE PAMOATE PRN MG: 50 CAPSULE ORAL at 21:46

## 2017-05-02 RX ADMIN — Medication SCH MG: at 21:45

## 2017-05-02 RX ADMIN — HYDROCORTISONE PRN APPLIC: 1 CREAM TOPICAL at 21:46

## 2017-05-02 RX ADMIN — TOLNAFTATE SCH APPLIC: 10 CREAM TOPICAL at 21:48

## 2017-05-02 RX ADMIN — IBUPROFEN PRN MG: 400 TABLET, FILM COATED ORAL at 18:01

## 2017-05-02 RX ADMIN — ANALGESIC BALM SCH APPLIC: 1.74; 4.06 OINTMENT TOPICAL at 21:46

## 2017-05-02 RX ADMIN — NICOTINE SCH: 14 PATCH, EXTENDED RELEASE TRANSDERMAL at 09:58

## 2017-05-02 RX ADMIN — HYDROCORTISONE PRN APPLIC: 1 CREAM TOPICAL at 09:56

## 2017-05-03 RX ADMIN — TOLNAFTATE SCH APPLIC: 10 CREAM TOPICAL at 21:45

## 2017-05-03 RX ADMIN — ANALGESIC BALM SCH APPLIC: 1.74; 4.06 OINTMENT TOPICAL at 09:45

## 2017-05-03 RX ADMIN — IBUPROFEN PRN MG: 400 TABLET, FILM COATED ORAL at 21:46

## 2017-05-03 RX ADMIN — HYDROXYZINE PAMOATE PRN MG: 50 CAPSULE ORAL at 21:45

## 2017-05-03 RX ADMIN — Medication SCH TAB: at 09:45

## 2017-05-03 RX ADMIN — Medication SCH MG: at 21:45

## 2017-05-03 RX ADMIN — IBUPROFEN PRN MG: 400 TABLET, FILM COATED ORAL at 09:46

## 2017-05-03 RX ADMIN — ANALGESIC BALM SCH APPLIC: 1.74; 4.06 OINTMENT TOPICAL at 21:45

## 2017-05-03 RX ADMIN — NICOTINE SCH: 14 PATCH, EXTENDED RELEASE TRANSDERMAL at 09:46

## 2017-05-03 RX ADMIN — TOLNAFTATE SCH: 10 CREAM TOPICAL at 09:46

## 2017-05-04 RX ADMIN — IBUPROFEN PRN MG: 400 TABLET, FILM COATED ORAL at 21:41

## 2017-05-04 RX ADMIN — HYDROXYZINE PAMOATE PRN MG: 50 CAPSULE ORAL at 21:41

## 2017-05-04 RX ADMIN — HYDROXYZINE PAMOATE PRN MG: 50 CAPSULE ORAL at 09:55

## 2017-05-04 RX ADMIN — NICOTINE SCH: 14 PATCH, EXTENDED RELEASE TRANSDERMAL at 09:56

## 2017-05-04 RX ADMIN — IBUPROFEN PRN MG: 400 TABLET, FILM COATED ORAL at 09:55

## 2017-05-04 RX ADMIN — Medication SCH TAB: at 09:55

## 2017-05-04 RX ADMIN — Medication SCH MG: at 21:43

## 2017-05-04 RX ADMIN — ANALGESIC BALM SCH APPLIC: 1.74; 4.06 OINTMENT TOPICAL at 21:42

## 2017-05-04 RX ADMIN — LIDOCAINE SCH PATCH: 50 PATCH TOPICAL at 09:56

## 2017-05-04 RX ADMIN — ANALGESIC BALM SCH: 1.74; 4.06 OINTMENT TOPICAL at 09:56

## 2017-05-04 RX ADMIN — TOLNAFTATE SCH: 10 CREAM TOPICAL at 21:44

## 2017-05-04 RX ADMIN — TOLNAFTATE SCH: 10 CREAM TOPICAL at 09:56

## 2017-05-05 RX ADMIN — LIDOCAINE SCH PATCH: 50 PATCH TOPICAL at 10:47

## 2017-05-05 RX ADMIN — IBUPROFEN PRN MG: 400 TABLET, FILM COATED ORAL at 10:02

## 2017-05-05 RX ADMIN — TOLNAFTATE SCH: 10 CREAM TOPICAL at 23:45

## 2017-05-05 RX ADMIN — Medication SCH TAB: at 10:02

## 2017-05-05 RX ADMIN — ANALGESIC BALM SCH: 1.74; 4.06 OINTMENT TOPICAL at 23:45

## 2017-05-05 RX ADMIN — NICOTINE SCH: 14 PATCH, EXTENDED RELEASE TRANSDERMAL at 10:04

## 2017-05-05 RX ADMIN — ANALGESIC BALM SCH: 1.74; 4.06 OINTMENT TOPICAL at 10:04

## 2017-05-05 RX ADMIN — TOLNAFTATE SCH: 10 CREAM TOPICAL at 10:03

## 2017-05-05 RX ADMIN — Medication SCH: at 23:45

## 2017-05-06 RX ADMIN — TOLNAFTATE SCH APPLIC: 10 CREAM TOPICAL at 09:55

## 2017-05-06 RX ADMIN — HYDROXYZINE PAMOATE PRN MG: 50 CAPSULE ORAL at 21:45

## 2017-05-06 RX ADMIN — IBUPROFEN PRN MG: 400 TABLET, FILM COATED ORAL at 21:46

## 2017-05-06 RX ADMIN — NICOTINE SCH: 14 PATCH, EXTENDED RELEASE TRANSDERMAL at 09:54

## 2017-05-06 RX ADMIN — ANALGESIC BALM SCH: 1.74; 4.06 OINTMENT TOPICAL at 09:54

## 2017-05-06 RX ADMIN — Medication SCH TAB: at 09:54

## 2017-05-06 RX ADMIN — ANALGESIC BALM SCH APPLIC: 1.74; 4.06 OINTMENT TOPICAL at 21:47

## 2017-05-06 RX ADMIN — TOLNAFTATE SCH: 10 CREAM TOPICAL at 22:15

## 2017-05-06 RX ADMIN — Medication SCH MG: at 21:45

## 2017-05-06 RX ADMIN — LIDOCAINE SCH PATCH: 50 PATCH TOPICAL at 09:53

## 2017-05-06 RX ADMIN — IBUPROFEN PRN MG: 400 TABLET, FILM COATED ORAL at 09:53

## 2017-05-07 RX ADMIN — Medication SCH TAB: at 09:47

## 2017-05-07 RX ADMIN — TOLNAFTATE SCH: 10 CREAM TOPICAL at 22:47

## 2017-05-07 RX ADMIN — Medication SCH MG: at 21:34

## 2017-05-07 RX ADMIN — ANALGESIC BALM SCH: 1.74; 4.06 OINTMENT TOPICAL at 22:47

## 2017-05-07 RX ADMIN — IBUPROFEN PRN MG: 400 TABLET, FILM COATED ORAL at 09:48

## 2017-05-07 RX ADMIN — TOLNAFTATE SCH APPLIC: 10 CREAM TOPICAL at 09:47

## 2017-05-07 RX ADMIN — ANALGESIC BALM SCH APPLIC: 1.74; 4.06 OINTMENT TOPICAL at 09:47

## 2017-05-07 RX ADMIN — NICOTINE SCH: 14 PATCH, EXTENDED RELEASE TRANSDERMAL at 09:47

## 2017-05-07 RX ADMIN — IBUPROFEN PRN MG: 400 TABLET, FILM COATED ORAL at 21:35

## 2017-05-07 RX ADMIN — LIDOCAINE SCH PATCH: 50 PATCH TOPICAL at 09:47

## 2017-05-07 RX ADMIN — HYDROXYZINE PAMOATE PRN MG: 50 CAPSULE ORAL at 21:34

## 2017-05-08 RX ADMIN — ANALGESIC BALM SCH APPLIC: 1.74; 4.06 OINTMENT TOPICAL at 10:17

## 2017-05-08 RX ADMIN — TOLNAFTATE SCH: 10 CREAM TOPICAL at 22:24

## 2017-05-08 RX ADMIN — IBUPROFEN PRN MG: 400 TABLET, FILM COATED ORAL at 10:16

## 2017-05-08 RX ADMIN — TOLNAFTATE SCH APPLIC: 10 CREAM TOPICAL at 10:17

## 2017-05-08 RX ADMIN — Medication SCH TAB: at 10:16

## 2017-05-08 RX ADMIN — LIDOCAINE SCH: 50 PATCH TOPICAL at 10:17

## 2017-05-08 RX ADMIN — Medication SCH: at 22:25

## 2017-05-08 RX ADMIN — ANALGESIC BALM SCH: 1.74; 4.06 OINTMENT TOPICAL at 22:24

## 2017-05-08 RX ADMIN — NICOTINE SCH: 14 PATCH, EXTENDED RELEASE TRANSDERMAL at 10:17

## 2017-05-09 RX ADMIN — HYDROXYZINE PAMOATE PRN MG: 50 CAPSULE ORAL at 21:11

## 2017-05-09 RX ADMIN — TOLNAFTATE SCH: 10 CREAM TOPICAL at 23:37

## 2017-05-09 RX ADMIN — ANALGESIC BALM SCH APPLIC: 1.74; 4.06 OINTMENT TOPICAL at 21:12

## 2017-05-09 RX ADMIN — ANALGESIC BALM SCH: 1.74; 4.06 OINTMENT TOPICAL at 10:33

## 2017-05-09 RX ADMIN — Medication SCH TAB: at 10:30

## 2017-05-09 RX ADMIN — IBUPROFEN PRN MG: 400 TABLET, FILM COATED ORAL at 21:12

## 2017-05-09 RX ADMIN — LIDOCAINE SCH PATCH: 50 PATCH TOPICAL at 10:31

## 2017-05-09 RX ADMIN — TOLNAFTATE SCH: 10 CREAM TOPICAL at 10:33

## 2017-05-09 RX ADMIN — IBUPROFEN PRN MG: 400 TABLET, FILM COATED ORAL at 10:31

## 2017-05-09 RX ADMIN — NICOTINE SCH: 14 PATCH, EXTENDED RELEASE TRANSDERMAL at 10:33

## 2017-05-09 RX ADMIN — Medication SCH MG: at 21:11

## 2017-05-10 RX ADMIN — LIDOCAINE SCH PATCH: 50 PATCH TOPICAL at 09:39

## 2017-05-10 RX ADMIN — ANALGESIC BALM SCH: 1.74; 4.06 OINTMENT TOPICAL at 21:53

## 2017-05-10 RX ADMIN — ANALGESIC BALM SCH: 1.74; 4.06 OINTMENT TOPICAL at 09:39

## 2017-05-10 RX ADMIN — NICOTINE SCH: 14 PATCH, EXTENDED RELEASE TRANSDERMAL at 09:39

## 2017-05-10 RX ADMIN — TOLNAFTATE SCH: 10 CREAM TOPICAL at 09:40

## 2017-05-10 RX ADMIN — TOLNAFTATE SCH: 10 CREAM TOPICAL at 21:53

## 2017-05-10 RX ADMIN — Medication SCH TAB: at 09:39

## 2017-05-10 RX ADMIN — Medication SCH MG: at 21:52

## 2017-05-10 RX ADMIN — IBUPROFEN PRN MG: 400 TABLET, FILM COATED ORAL at 21:52

## 2017-05-10 RX ADMIN — IBUPROFEN PRN MG: 400 TABLET, FILM COATED ORAL at 09:40

## 2017-05-11 RX ADMIN — NICOTINE SCH: 14 PATCH, EXTENDED RELEASE TRANSDERMAL at 09:45

## 2017-05-11 RX ADMIN — TOLNAFTATE SCH: 10 CREAM TOPICAL at 22:55

## 2017-05-11 RX ADMIN — IBUPROFEN PRN MG: 400 TABLET, FILM COATED ORAL at 21:03

## 2017-05-11 RX ADMIN — Medication SCH MG: at 21:03

## 2017-05-11 RX ADMIN — ANALGESIC BALM SCH APPLIC: 1.74; 4.06 OINTMENT TOPICAL at 09:44

## 2017-05-11 RX ADMIN — TOLNAFTATE SCH: 10 CREAM TOPICAL at 09:45

## 2017-05-11 RX ADMIN — LIDOCAINE SCH PATCH: 50 PATCH TOPICAL at 09:44

## 2017-05-11 RX ADMIN — Medication SCH TAB: at 09:44

## 2017-05-11 RX ADMIN — IBUPROFEN PRN MG: 400 TABLET, FILM COATED ORAL at 09:44

## 2017-05-11 RX ADMIN — ANALGESIC BALM SCH: 1.74; 4.06 OINTMENT TOPICAL at 22:54

## 2017-05-12 VITALS — TEMPERATURE: 98.4 F | DIASTOLIC BLOOD PRESSURE: 63 MMHG | HEART RATE: 76 BPM | SYSTOLIC BLOOD PRESSURE: 106 MMHG

## 2017-05-12 PROCEDURE — HZ42ZZZ GROUP COUNSELING FOR SUBSTANCE ABUSE TREATMENT, COGNITIVE-BEHAVIORAL: ICD-10-PCS | Performed by: PSYCHIATRY & NEUROLOGY

## 2017-05-12 RX ADMIN — NICOTINE SCH: 14 PATCH, EXTENDED RELEASE TRANSDERMAL at 10:03

## 2017-05-12 RX ADMIN — ANALGESIC BALM SCH: 1.74; 4.06 OINTMENT TOPICAL at 10:03

## 2017-05-12 RX ADMIN — Medication SCH TAB: at 10:02

## 2017-05-12 RX ADMIN — TOLNAFTATE SCH: 10 CREAM TOPICAL at 10:03

## 2017-05-12 RX ADMIN — LIDOCAINE SCH: 50 PATCH TOPICAL at 10:03

## 2017-05-12 NOTE — PN
Psychiatric Progress Note


Vital Signs: 


 Vital Signs











 Period  Temp  Pulse  Resp  BP Sys/Bland  Pulse Ox


 


 Last 24 Hr  98.4 F  76  18-18  106/63  











Date of Session: 05/12/17


Chief Complaint:: Discharge Note


HPI: Patient addressing Opoid, Cocaine and Cannabis Dependence comorbid with 

Substance-Induced Mood Disorder


Current Medications: 


Active Medications











Generic Name Dose Route Start Last Admin





  Trade Name Freq  PRN Reason Stop Dose Admin


 


Acetaminophen  650 mg 04/27/17 13:26  





  Tylenol -  PO   





  Q4H PRN   





  FEVER OR PAIN   


 


Al Hydroxide/Mg Hydroxide  30 ml 04/27/17 13:26  





  Mylanta Oral Suspension -  PO   





  Q6H PRN   





  DYSPEPSIA   


 


Diphenhydramine HCl  50 mg 04/27/17 13:26  





  Benadryl -  PO   





  HSMR1 PRN   





  FOR ITCHING   


 


Eucalyptus/Menthol/Phenol/Sorbitol  1 each 04/27/17 13:26  





  Cepastat Lozenge -  MM   





  Q4H PRN   





  SORE THROAT   


 


Guaifenesin  10 ml 04/27/17 13:26  





  Robitussin Dm -  PO   





  Q6H PRN   





  COUGH   


 


Hydrocortisone  1 applic 04/27/17 13:27 05/02/17 21:46





  Hytone 1% Cream -  TP   1 applic





  BID PRN   Administration





  DRY SKIN   


 


Hydroxyzine Pamoate  50 mg 05/01/17 11:15 05/09/17 21:11





  Vistaril -  PO   50 mg





  Q6H PRN   Administration





  ANXIETY   


 


Ibuprofen  400 mg 04/27/17 13:26 05/11/17 21:03





  Motrin -  PO   400 mg





  Q6H PRN   Administration





  PAIN   


 


Lidocaine  1 patch 05/04/17 10:00 05/11/17 09:44





  Lidoderm Patch -  TP   1 patch





  DAILY JOSEPH   Administration


 


Loperamide HCl  4 mg 04/27/17 13:26  





  Imodium -  PO   





  Q6H PRN   





  DIARRHEA   


 


Magnesium Hydroxide  30 ml 04/27/17 13:26  





  Milk Of Magnesia -  PO   





  DAILY PRN   





  CONSTIPATION   


 


Methyl Salicylate  1 applic 05/02/17 22:00 05/11/17 22:54





  Ols-Hardy -  TP   Not Given





  BID JOSEPH   


 


Nicotine  14 mg 04/28/17 10:00 05/11/17 09:45





  Nicoderm Patch -  TD   Not Given





  DAILY JOSEPH   


 


Nicotine Polacrilex  2 mg 04/27/17 13:26  





  Nicorette Gum -  BUC   





  Q2H PRN   





  NICOTINE REPLACEMENT RX   


 


Prenatal Multivit/Folic Acid/Iron  1 tab 04/28/17 10:00 05/11/17 09:44





  Prenatal Vitamins (Sjr) -  PO   1 tab





  DAILY JOSEPH   Administration


 


Pseudoephedrine/Triprolidine  1 combo 04/27/17 13:26  





  Actifed -  PO   





  TID PRN   





  NASAL CONGESTION   


 


Thiamine HCl  100 mg 04/27/17 22:00 05/11/17 21:03





  Vitamin B1 -  PO   100 mg





  HS JOSEPH   Administration


 


Tolnaftate  1 applic 05/02/17 22:00 05/11/17 22:55





  Tinactin 1% Cream -  TP   Not Given





  BID JOSEPH   











Current Side Effect: No


Lab tests ordered: Yes


Lab tests reviewed: Yes


Provider note:: Patient has completed this program today. he has met his 

treatment goals and will continue to address his issues in outpatient treatment 

at [facility] at [address] [place] [address]. He verbalized 

understanding of the negative consequences of his addiction and he recognized 

the importance of establishing a sober support network in order to maintain 

sobriety. He is stable for discharge today


Total face to face time:: 35





Mental Status Exam





- Mental Status Exam


Alert and Oriented to: Time, Place, Person


Cognitive Function: Fair


Mood: Hopeful, Euthymic


Affect: Appropriate


Patient Behavior: Cooperative


Speech Pattern: Clear, Artificially Ventilated


Thought Process: Intact, Goal Oriented


Thought Disorder: Not Present


Hallucinations: Denies


Suicidal Ideation: Denies


Homicidal Ideation: Denies


Insight/Judgement: Fair


Sleep: Fair


Appetite: Good


Muscle strength/Tone: Normal


Gait/Station: Normal





Psychiatric Treatment Plan





- Problem List


(1) Opioid dependence with withdrawal


Current Visit: No





(2) Cocaine dependence with withdrawal


Current Visit: No





(3) Cannabis dependence, uncomplicated


Current Visit: No





(4) Substance induced mood disorder


Current Visit: Yes





(5) Bipolar disorder


Current Visit: Yes   


Initial treatment plan: Patient is discharged today and referred to [facility] for outpatient treatment